# Patient Record
Sex: MALE | Race: WHITE | URBAN - METROPOLITAN AREA
[De-identification: names, ages, dates, MRNs, and addresses within clinical notes are randomized per-mention and may not be internally consistent; named-entity substitution may affect disease eponyms.]

---
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
## 2020-03-03 ENCOUNTER — OFFICE VISIT (OUTPATIENT)
Dept: ORTHOPEDIC SURGERY | Age: 20
End: 2020-03-03
Payer: COMMERCIAL

## 2020-03-03 VITALS — BODY MASS INDEX: 20.31 KG/M2 | WEIGHT: 149.91 LBS | HEIGHT: 72 IN

## 2020-03-03 PROCEDURE — 99203 OFFICE O/P NEW LOW 30 MIN: CPT | Performed by: ORTHOPAEDIC SURGERY

## 2020-03-03 NOTE — PROGRESS NOTES
Chief Complaint  Shoulder Pain (OPNP RIGHT SHOULDER/CLAVICLE PAIN: NEW XRAYS: STARTED HAVING PAIN 02/23/2020 AFTER PLAYING SOCCER )      History of Present Illness: Carine Palma is a 22 y.o. y/o male who presents today for evaluation of his right shoulder. Just over a week ago he was playing soccer when he slid and noticed pain in his shoulder when he was struck by another player. Pain got a little bit worse and has had some pain mostly in the anterior aspect of his shoulder and just medial to his coracoid. He also had some swelling. He has improvement in the swelling. No feeling of instability or dislocation. No prior shoulder surgery, injection, or therapy. He did go to urgent care where he was evaluated and told that there was no fracture. No numbness or tingling. Occupation/activities:  [employer]    Medical History  Past Medical History:   Diagnosis Date    Wrist fracture, left 4/1/2013    Wrist fracture, right 6/2013       History reviewed. No pertinent surgical history.     Social History     Socioeconomic History    Marital status: Single     Spouse name: Not on file    Number of children: Not on file    Years of education: Not on file    Highest education level: Not on file   Occupational History    Not on file   Social Needs    Financial resource strain: Not on file    Food insecurity:     Worry: Not on file     Inability: Not on file    Transportation needs:     Medical: Not on file     Non-medical: Not on file   Tobacco Use    Smoking status: Never Smoker    Smokeless tobacco: Never Used   Substance and Sexual Activity    Alcohol use: Not on file    Drug use: Not on file    Sexual activity: Not on file   Lifestyle    Physical activity:     Days per week: Not on file     Minutes per session: Not on file    Stress: Not on file   Relationships    Social connections:     Talks on phone: Not on file     Gets together: Not on file     Attends [employer] service: Not on file